# Patient Record
Sex: FEMALE | Race: WHITE | NOT HISPANIC OR LATINO | Employment: UNEMPLOYED | URBAN - METROPOLITAN AREA
[De-identification: names, ages, dates, MRNs, and addresses within clinical notes are randomized per-mention and may not be internally consistent; named-entity substitution may affect disease eponyms.]

---

## 2017-12-22 ENCOUNTER — HOSPITAL ENCOUNTER (EMERGENCY)
Facility: HOSPITAL | Age: 37
Discharge: HOME | End: 2017-12-22
Attending: EMERGENCY MEDICINE | Admitting: EMERGENCY MEDICINE

## 2017-12-22 VITALS
OXYGEN SATURATION: 100 % | WEIGHT: 230 LBS | SYSTOLIC BLOOD PRESSURE: 129 MMHG | HEART RATE: 91 BPM | TEMPERATURE: 97.8 F | BODY MASS INDEX: 34.07 KG/M2 | RESPIRATION RATE: 16 BRPM | DIASTOLIC BLOOD PRESSURE: 91 MMHG | HEIGHT: 69 IN

## 2017-12-22 DIAGNOSIS — K13.0 LIP ABSCESS: Primary | ICD-10-CM

## 2017-12-22 PROCEDURE — 99283 EMERGENCY DEPT VISIT LOW MDM: CPT

## 2017-12-22 PROCEDURE — 87070 CULTURE OTHR SPECIMN AEROBIC: CPT | Performed by: PHYSICIAN ASSISTANT

## 2017-12-22 PROCEDURE — 87077 CULTURE AEROBIC IDENTIFY: CPT | Performed by: PHYSICIAN ASSISTANT

## 2017-12-22 PROCEDURE — 87205 SMEAR GRAM STAIN: CPT | Performed by: PHYSICIAN ASSISTANT

## 2017-12-22 PROCEDURE — 87147 CULTURE TYPE IMMUNOLOGIC: CPT | Performed by: PHYSICIAN ASSISTANT

## 2017-12-22 PROCEDURE — 87186 SC STD MICRODIL/AGAR DIL: CPT | Performed by: PHYSICIAN ASSISTANT

## 2017-12-22 RX ORDER — LIDOCAINE HYDROCHLORIDE 10 MG/ML
10 INJECTION, SOLUTION EPIDURAL; INFILTRATION; INTRACAUDAL; PERINEURAL ONCE
Status: COMPLETED | OUTPATIENT
Start: 2017-12-22 | End: 2017-12-22

## 2017-12-22 RX ORDER — SULFAMETHOXAZOLE AND TRIMETHOPRIM 800; 160 MG/1; MG/1
1 TABLET ORAL 2 TIMES DAILY
Qty: 20 TABLET | Refills: 0 | Status: SHIPPED | OUTPATIENT
Start: 2017-12-22 | End: 2018-01-01

## 2017-12-22 RX ORDER — CEPHALEXIN 500 MG/1
500 CAPSULE ORAL 3 TIMES DAILY
Qty: 30 CAPSULE | Refills: 0 | Status: SHIPPED | OUTPATIENT
Start: 2017-12-22 | End: 2018-01-01

## 2017-12-22 RX ADMIN — LIDOCAINE HYDROCHLORIDE 10 ML: 10 INJECTION, SOLUTION EPIDURAL; INFILTRATION; INTRACAUDAL; PERINEURAL at 11:37

## 2017-12-22 NOTE — DISCHARGE INSTRUCTIONS
Follow up with one of the [facility] physician groups below to setup primary care. If you have trouble following up, please call Tanisha Moe, our transitional care nurse, at [phone].    (Dr. Littlejohn, Dr. Bridges, Dr. Doshi, and Dr. Ha.)  [facility], Primary Care, [phone], [address], [place] [address]    [facility], Primary Care, [phone], [address], [address] [place], [address] [facility]) [facility], Primary Care, [phone], [address], [address] [place], [address] [facility], Primary Care, [phone], [address] [place], [address]     [place] [address] [facility], Primary Care, [phone], [address], [address] [place], [address]    [place] [address] [facility], Primary Care, [phone], [address]    [place], [address] [facility], Primary Care, [phone], [address], [address] [place], [address] [facility], Primary Care, [phone], [address] [place], [address] [facility]) [facility], Primary Care, [phone], [address] [place], [address] [facility], Primary Care, [phone], [address] [place], [address] [facility], Primary Care, [phone], [address], [place], [address]     [facility], Primary Care, [phone], [address] [id number]

## 2017-12-23 NOTE — ED PROVIDER NOTES
Subjective   Patient is a 37 y.o. female presenting with abscess.   History provided by:  Patient  Abscess   Location:  Face  Facial abscess location:  Upper lip  Abscess quality: fluctuance, induration, painful and redness    Abscess quality: not draining    Duration:  3 days  Progression:  Worsening  Chronicity:  Recurrent  Context: injected drug use    Context: not diabetes and not immunosuppression    Ineffective treatments:  Warm compresses  Associated symptoms: no fever    Risk factors: hx of MRSA and prior abscess        Review of Systems   Constitutional: Negative for fever.   HENT: Negative for trouble swallowing.    Respiratory: Negative for shortness of breath.    Skin:        Upper Lip Abscess    All other systems reviewed and are negative.      Past Medical History:   Diagnosis Date   • Graves disease    • Hypoglycemia        No Known Allergies    Past Surgical History:   Procedure Laterality Date   •  SECTION     • THYROID SURGERY         History reviewed. No pertinent family history.    Social History     Social History   • Marital status: Significant Other     Spouse name: N/A   • Number of children: N/A   • Years of education: N/A     Social History Main Topics   • Smoking status: Never Smoker   • Smokeless tobacco: None   • Alcohol use No   • Drug use: No      Comment: REPORTS HX OF DRUG ABUSE   • Sexual activity: Not Asked     Other Topics Concern   • None     Social History Narrative   • None           Objective   Physical Exam   Constitutional: She appears well-developed. No distress.   HENT:   Mouth/Throat: Oropharynx is clear and moist.       Eyes: Conjunctivae are normal. Pupils are equal, round, and reactive to light.   Neck: Normal range of motion. Neck supple.   Cardiovascular: Normal rate and regular rhythm.    Pulmonary/Chest: Effort normal and breath sounds normal.   Musculoskeletal: Normal range of motion.   Neurological: She is alert.   Skin: Skin is warm.   Multiple track  marks noted on hands   Psychiatric: She has a normal mood and affect.   Nursing note and vitals reviewed.      Incision & Drainage  Date/Time: 12/22/2017 8:34 PM  Performed by: KAYDEN CRAMER  Authorized by: JUANCARLOS TRISTAN     Consent:     Consent obtained:  Verbal    Consent given by:  Patient    Risks discussed:  Bleeding, incomplete drainage, infection and pain  Location:     Type:  Abscess    Size:  2x2 cm    Location: Upper lip.  Pre-procedure details:     Skin preparation:  Betadine  Anesthesia (see MAR for exact dosages):     Anesthesia method:  Local infiltration    Local anesthetic:  Lidocaine 1% w/o epi  Procedure type:     Complexity:  Complex  Procedure details:     Incision types:  Single straight    Incision depth:  Submucosal    Scalpel blade:  11    Wound management:  Probed and deloculated    Drainage:  Serosanguinous    Drainage amount:  Moderate    Wound treatment:  Wound left open    Packing materials:  None  Post-procedure details:     Patient tolerance of procedure:  Tolerated well, no immediate complications             ED Course  ED Course      Discussed treatment plan with patient and she is agreeable with plan. Discussed need for continued use of warm compresses and to finish antibiotics. Explained importance of f/u with PCP. She will return to ED if worse or new sx. Pt started on Bactrim and Keflex.     No results found for this or any previous visit (from the past 24 hour(s)).  Note: In addition to lab results from this visit, the labs listed above may include labs taken at another facility or during a different encounter within the last 24 hours. Please correlate lab times with ED admission and discharge times for further clarification of the services performed during this visit.    No orders to display     Vitals:    12/22/17 1046 12/22/17 1200   BP: 155/84 129/91   BP Location: Left arm    Patient Position: Sitting    Pulse: 91    Resp: 16    Temp: 97.8 °F (36.6 °C)    TempSrc:  "Oral    SpO2: 99% 100%   Weight: 104 kg (230 lb)    Height: 175.3 cm (69\")      Medications   lidocaine PF 1% (XYLOCAINE) injection 10 mL (10 mL Injection Given by Other 12/22/17 1137)                         MDM    Final diagnoses:   Lip abscess            ARCHANA Dumont  12/22/17 2037    "

## 2017-12-24 LAB
BACTERIA SPEC AEROBE CULT: ABNORMAL
BACTERIA SPEC AEROBE CULT: ABNORMAL
GRAM STN SPEC: ABNORMAL
GRAM STN SPEC: ABNORMAL

## 2017-12-25 ENCOUNTER — TELEPHONE (OUTPATIENT)
Dept: EMERGENCY DEPT | Facility: HOSPITAL | Age: 37
End: 2017-12-25